# Patient Record
Sex: FEMALE | Race: WHITE | NOT HISPANIC OR LATINO | URBAN - METROPOLITAN AREA
[De-identification: names, ages, dates, MRNs, and addresses within clinical notes are randomized per-mention and may not be internally consistent; named-entity substitution may affect disease eponyms.]

---

## 2021-02-23 ENCOUNTER — EMERGENCY (EMERGENCY)
Facility: HOSPITAL | Age: 71
LOS: 1 days | Discharge: HOME | End: 2021-02-23
Payer: MEDICARE

## 2021-02-23 VITALS
SYSTOLIC BLOOD PRESSURE: 169 MMHG | OXYGEN SATURATION: 96 % | DIASTOLIC BLOOD PRESSURE: 77 MMHG | TEMPERATURE: 98 F | RESPIRATION RATE: 18 BRPM | HEART RATE: 100 BPM

## 2021-02-23 LAB — SARS-COV-2 RNA SPEC QL NAA+PROBE: DETECTED

## 2021-02-23 PROCEDURE — 99283 EMERGENCY DEPT VISIT LOW MDM: CPT

## 2021-02-23 PROCEDURE — 99282 EMERGENCY DEPT VISIT SF MDM: CPT

## 2021-02-23 PROCEDURE — U0003: CPT

## 2021-02-23 PROCEDURE — U0005: CPT

## 2021-02-23 NOTE — ED PROVIDER NOTE - PATIENT PORTAL LINK FT
You can access the FollowMyHealth Patient Portal offered by [facility] by registering at the following website: [email or web address]. By joining [facility]’s FollowMyHealth portal, you will also be able to view your health information using other applications (apps) compatible with our system.

## 2021-02-23 NOTE — ED PROVIDER NOTE - NS ED ROS FT
Gen: denies fever, chills, fatigue  Skin: denies rashes  HEENT: denies ear pain, nasal congestion, throat pain, loss of smell/taste  Respiratory: denies SOB, cough, wheezing  Cardiovascular: denies chest pain, palpitations  GI: denies abdominal pain, n/v/d  MSK: denies body aches

## 2021-02-23 NOTE — ED PROVIDER NOTE - OBJECTIVE STATEMENT
71yo F presenting for covid testing. Pt had positive exposure last week. Pt feeling well, no symptoms. Denies fever, chills, cough, sob, cp, body aches, loss of smell/taste.

## 2025-08-13 ENCOUNTER — OFFICE (OUTPATIENT)
Dept: URBAN - METROPOLITAN AREA CLINIC 70 | Facility: CLINIC | Age: 75
Setting detail: OPHTHALMOLOGY
End: 2025-08-13
Payer: MEDICARE

## 2025-08-13 DIAGNOSIS — H00.15: ICD-10-CM

## 2025-08-13 DIAGNOSIS — H25.13: ICD-10-CM

## 2025-08-13 DIAGNOSIS — H00.12: ICD-10-CM

## 2025-08-13 PROCEDURE — 92004 COMPRE OPH EXAM NEW PT 1/>: CPT | Performed by: OPHTHALMOLOGY

## 2025-08-13 ASSESSMENT — REFRACTION_AUTOREFRACTION
OD_SPHERE: +0.50
OS_CYLINDER: -0.25
OS_SPHERE: 0.00
OD_CYLINDER: -0.75
OS_AXIS: 036
OD_AXIS: 055

## 2025-08-13 ASSESSMENT — REFRACTION_CURRENTRX
OS_SPHERE: +3.75
OS_SPHERE: PLANO
OS_VPRISM_DIRECTION: SV
OS_ADD: +2.75
OS_AXIS: 169
OD_VPRISM_DIRECTION: PROGS
OS_VPRISM_DIRECTION: PROGS
OD_SPHERE: +3.75
OD_CYLINDER: -0.50
OD_VPRISM_DIRECTION: SV
OD_SPHERE: +0.75
OD_AXIS: 101
OS_OVR_VA: 20/
OD_OVR_VA: 20/
OS_CYLINDER: -0.25
OS_OVR_VA: 20/
OS_CYLINDER: -0.50
OS_AXIS: 084
OD_ADD: +2.75
OD_CYLINDER: -0.75
OD_AXIS: 103
OD_OVR_VA: 20/

## 2025-08-13 ASSESSMENT — KERATOMETRY
OD_K1POWER_DIOPTERS: 43.50
OD_AXISANGLE_DEGREES: 064
OS_K2POWER_DIOPTERS: 43.75
OS_AXISANGLE_DEGREES: 079
OD_K2POWER_DIOPTERS: 43.75
OS_K1POWER_DIOPTERS: 43.50

## 2025-08-13 ASSESSMENT — CONFRONTATIONAL VISUAL FIELD TEST (CVF)
OS_FINDINGS: FULL
OD_FINDINGS: FULL

## 2025-08-13 ASSESSMENT — VISUAL ACUITY
OD_BCVA: 20/40-
OS_BCVA: 20/40-2

## 2025-08-21 ENCOUNTER — OFFICE (OUTPATIENT)
Dept: URBAN - METROPOLITAN AREA CLINIC 100 | Facility: CLINIC | Age: 75
Setting detail: OPHTHALMOLOGY
End: 2025-08-21
Payer: MEDICARE

## 2025-08-21 DIAGNOSIS — H00.15: ICD-10-CM

## 2025-08-21 DIAGNOSIS — H00.12: ICD-10-CM

## 2025-08-21 PROBLEM — H25.13 CATARACT SENILE NUCLEAR SCLEROSIS; BOTH EYES: Status: ACTIVE | Noted: 2025-08-13

## 2025-08-21 PROCEDURE — 99213 OFFICE O/P EST LOW 20 MIN: CPT | Performed by: OPHTHALMOLOGY

## 2025-08-21 PROCEDURE — 92285 EXTERNAL OCULAR PHOTOGRAPHY: CPT | Performed by: OPHTHALMOLOGY

## 2025-08-21 ASSESSMENT — KERATOMETRY
OD_AXISANGLE_DEGREES: 064
OS_K1POWER_DIOPTERS: 43.50
OS_AXISANGLE_DEGREES: 079
OS_K2POWER_DIOPTERS: 43.75
OD_K2POWER_DIOPTERS: 43.75
OD_K1POWER_DIOPTERS: 43.50

## 2025-08-21 ASSESSMENT — VISUAL ACUITY
OS_BCVA: 20/50-2
OD_BCVA: 20/40+

## 2025-08-21 ASSESSMENT — REFRACTION_AUTOREFRACTION
OD_CYLINDER: -0.75
OD_SPHERE: +0.50
OD_AXIS: 055
OS_CYLINDER: -0.25
OS_SPHERE: 0.00
OS_AXIS: 036

## 2025-08-21 ASSESSMENT — CONFRONTATIONAL VISUAL FIELD TEST (CVF)
OS_FINDINGS: FULL
OD_FINDINGS: FULL